# Patient Record
Sex: FEMALE | Race: BLACK OR AFRICAN AMERICAN | NOT HISPANIC OR LATINO | Employment: FULL TIME | URBAN - METROPOLITAN AREA
[De-identification: names, ages, dates, MRNs, and addresses within clinical notes are randomized per-mention and may not be internally consistent; named-entity substitution may affect disease eponyms.]

---

## 2024-10-17 DIAGNOSIS — B19.10: Primary | ICD-10-CM

## 2024-11-13 ENCOUNTER — TELEPHONE (OUTPATIENT)
Dept: INFECTIOUS DISEASES | Facility: CLINIC | Age: 24
End: 2024-11-13
Payer: MEDICAID

## 2024-11-13 DIAGNOSIS — B19.10: Primary | ICD-10-CM

## 2024-11-13 NOTE — LETTER
November 13, 2024    Sandie Marr  [address] [facility] [address]             [facility]  [address] [facility] [address]  Phone: [phone] Ms. Marr,      Our attempts to reach you by telephone have been unsuccessful.    Please reach out to me by dialing  regarding labs for your upcoming appointment.    Looking forward to hearing from you soon.      Respectfully,        [facility]

## 2024-12-12 ENCOUNTER — OFFICE VISIT (OUTPATIENT)
Dept: INFECTIOUS DISEASES | Facility: CLINIC | Age: 24
End: 2024-12-12
Payer: MEDICAID

## 2024-12-12 ENCOUNTER — LAB VISIT (OUTPATIENT)
Dept: LAB | Facility: HOSPITAL | Age: 24
End: 2024-12-12
Attending: NURSE PRACTITIONER
Payer: MEDICAID

## 2024-12-12 VITALS
TEMPERATURE: 98 F | HEART RATE: 111 BPM | WEIGHT: 109.88 LBS | HEIGHT: 59 IN | BODY MASS INDEX: 22.15 KG/M2 | SYSTOLIC BLOOD PRESSURE: 90 MMHG | RESPIRATION RATE: 12 BRPM | DIASTOLIC BLOOD PRESSURE: 60 MMHG

## 2024-12-12 DIAGNOSIS — Z86.19 HISTORY OF HEPATITIS B: ICD-10-CM

## 2024-12-12 DIAGNOSIS — B19.10: ICD-10-CM

## 2024-12-12 DIAGNOSIS — Z23 NEED FOR VACCINATION: ICD-10-CM

## 2024-12-12 DIAGNOSIS — Z86.19 HISTORY OF HEPATITIS B: Primary | ICD-10-CM

## 2024-12-12 LAB
ALBUMIN SERPL-MCNC: 4.6 G/DL (ref 3.5–5)
ALBUMIN/GLOB SERPL: 1.4 RATIO (ref 1.1–2)
ALP SERPL-CCNC: 57 UNIT/L (ref 40–150)
ALT SERPL-CCNC: 13 UNIT/L (ref 0–55)
ANION GAP SERPL CALC-SCNC: 8 MEQ/L
AST SERPL-CCNC: 20 UNIT/L (ref 5–34)
BASOPHILS # BLD AUTO: 0.07 X10(3)/MCL
BASOPHILS NFR BLD AUTO: 1 %
BILIRUB SERPL-MCNC: 0.5 MG/DL
BUN SERPL-MCNC: 9.4 MG/DL (ref 7–18.7)
C TRACH RRNA UR QL NAA+PROBE: NOT DETECTED
CALCIUM SERPL-MCNC: 10 MG/DL (ref 8.4–10.2)
CHLORIDE SERPL-SCNC: 108 MMOL/L (ref 98–107)
CO2 SERPL-SCNC: 25 MMOL/L (ref 22–29)
CREAT SERPL-MCNC: 0.79 MG/DL (ref 0.55–1.02)
CREAT/UREA NIT SERPL: 12
EOSINOPHIL # BLD AUTO: 0.08 X10(3)/MCL (ref 0–0.9)
EOSINOPHIL NFR BLD AUTO: 1.1 %
ERYTHROCYTE [DISTWIDTH] IN BLOOD BY AUTOMATED COUNT: 17.5 % (ref 11.5–17)
GFR SERPLBLD CREATININE-BSD FMLA CKD-EPI: >60 ML/MIN/1.73/M2
GLOBULIN SER-MCNC: 3.4 GM/DL (ref 2.4–3.5)
GLUCOSE SERPL-MCNC: 92 MG/DL (ref 74–100)
HAV AB SER QL IA: NONREACTIVE
HBV CORE AB SERPL QL IA: REACTIVE
HBV SURFACE AB SER-ACNC: 203.04 MIU/ML
HBV SURFACE AB SERPL IA-ACNC: REACTIVE M[IU]/ML
HBV SURFACE AG SERPL QL IA: NONREACTIVE
HCT VFR BLD AUTO: 36.3 % (ref 37–47)
HCV AB SERPL QL IA: NONREACTIVE
HGB BLD-MCNC: 11 G/DL (ref 12–16)
HIV 1+2 AB+HIV1 P24 AG SERPL QL IA: NONREACTIVE
IMM GRANULOCYTES # BLD AUTO: 0.01 X10(3)/MCL (ref 0–0.04)
IMM GRANULOCYTES NFR BLD AUTO: 0.1 %
INR PPP: 1
LYMPHOCYTES # BLD AUTO: 2.1 X10(3)/MCL (ref 0.6–4.6)
LYMPHOCYTES NFR BLD AUTO: 29.7 %
MCH RBC QN AUTO: 22.2 PG (ref 27–31)
MCHC RBC AUTO-ENTMCNC: 30.3 G/DL (ref 33–36)
MCV RBC AUTO: 73.3 FL (ref 80–94)
MONOCYTES # BLD AUTO: 0.59 X10(3)/MCL (ref 0.1–1.3)
MONOCYTES NFR BLD AUTO: 8.4 %
N GONORRHOEA DNA UR QL NAA+PROBE: NOT DETECTED
NEUTROPHILS # BLD AUTO: 4.21 X10(3)/MCL (ref 2.1–9.2)
NEUTROPHILS NFR BLD AUTO: 59.7 %
NRBC BLD AUTO-RTO: 0 %
PLATELET # BLD AUTO: 354 X10(3)/MCL (ref 130–400)
PMV BLD AUTO: 9.4 FL (ref 7.4–10.4)
POTASSIUM SERPL-SCNC: 3.9 MMOL/L (ref 3.5–5.1)
PROT SERPL-MCNC: 8 GM/DL (ref 6.4–8.3)
PROTHROMBIN TIME: 13.4 SECONDS (ref 11.4–14)
RBC # BLD AUTO: 4.95 X10(6)/MCL (ref 4.2–5.4)
SODIUM SERPL-SCNC: 141 MMOL/L (ref 136–145)
T PALLIDUM AB SER QL: NONREACTIVE
T VAGINALIS RRNA UR QL NAA+PROBE: NOT DETECTED
WBC # BLD AUTO: 7.06 X10(3)/MCL (ref 4.5–11.5)

## 2024-12-12 PROCEDURE — 87389 HIV-1 AG W/HIV-1&-2 AB AG IA: CPT

## 2024-12-12 PROCEDURE — 85610 PROTHROMBIN TIME: CPT

## 2024-12-12 PROCEDURE — 36415 COLL VENOUS BLD VENIPUNCTURE: CPT

## 2024-12-12 PROCEDURE — 99213 OFFICE O/P EST LOW 20 MIN: CPT | Mod: PBBFAC | Performed by: NURSE PRACTITIONER

## 2024-12-12 PROCEDURE — 87340 HEPATITIS B SURFACE AG IA: CPT

## 2024-12-12 PROCEDURE — 87350 HEPATITIS BE AG IA: CPT

## 2024-12-12 PROCEDURE — 86704 HEP B CORE ANTIBODY TOTAL: CPT

## 2024-12-12 PROCEDURE — 86692 HEPATITIS DELTA AGENT ANTBDY: CPT

## 2024-12-12 PROCEDURE — 87661 TRICHOMONAS VAGINALIS AMPLIF: CPT | Performed by: NURSE PRACTITIONER

## 2024-12-12 PROCEDURE — 86706 HEP B SURFACE ANTIBODY: CPT

## 2024-12-12 PROCEDURE — 80053 COMPREHEN METABOLIC PANEL: CPT

## 2024-12-12 PROCEDURE — 87517 HEPATITIS B DNA QUANT: CPT

## 2024-12-12 PROCEDURE — 90471 IMMUNIZATION ADMIN: CPT | Mod: PBBFAC

## 2024-12-12 PROCEDURE — 85025 COMPLETE CBC W/AUTO DIFF WBC: CPT

## 2024-12-12 PROCEDURE — 86803 HEPATITIS C AB TEST: CPT

## 2024-12-12 PROCEDURE — 90632 HEPA VACCINE ADULT IM: CPT | Mod: PBBFAC

## 2024-12-12 PROCEDURE — 86707 HEPATITIS BE ANTIBODY: CPT

## 2024-12-12 PROCEDURE — 86708 HEPATITIS A ANTIBODY: CPT

## 2024-12-12 PROCEDURE — 86780 TREPONEMA PALLIDUM: CPT

## 2024-12-12 RX ORDER — LANOLIN ALCOHOL/MO/W.PET/CERES
1 CREAM (GRAM) TOPICAL
COMMUNITY

## 2024-12-12 RX ORDER — ERGOCALCIFEROL 1.25 MG/1
50000 CAPSULE ORAL
COMMUNITY

## 2024-12-12 RX ADMIN — HEPATITIS A VACCINE 1440 UNITS: 1440 INJECTION, SUSPENSION INTRAMUSCULAR at 11:12

## 2024-12-12 NOTE — PROGRESS NOTES
Patient ID: Sandie Marr 24 y.o.     Chief Complaint:   Chief Complaint   Patient presents with    New Patient Hep B     Denies symptoms        HPI:    12/12/24  Sandie Capellan is a 25 yo AAF referred to [facility] by PCP EMANUEL Weeks NP for evaluation of Hep B.  She tells me that she was first informed about Hep B infection Jan 2024.  She has 2 children, ages 3 & 4, received prenatal care and never tested positive for Hep B during pregnancies. She has no known family history of Hep B.  No history for IVDU. She has not been sexually active in 7 months. She has several tattoos, one of which was placed in 2023. The tattoos were placed by professionals, the most recent one described as by a girl in her shop and she does not recall seeing the supplies opened in front of her. She denies recalling any current or past history of jaundice, icterus, nausea, vomiting, dark urine, or gisel colored stools. Labs collected Jan 2024 revealed Hep B s ag NR, Hep B c IgM +, Hep B c ab total +, Hep B s ab +.  Repeat labs with same results 9/24.  Liver enzymes normal.  HIV, Hep C, syphilis negative 1/24. Hep A ab + 1/2024, NR 9/24. Likely HBV exposure with spontaneous clearing, will repeat labs today for confirmation. Results explained to patient. All questions answered & concerns addressed.            Past Medical History:   Diagnosis Date    Anemia, unspecified     Vitamin D deficiency, unspecified         History reviewed. No pertinent surgical history.     Social History     Socioeconomic History    Marital status: Single   Tobacco Use    Smoking status: Every Day     Current packs/day: 0.50     Average packs/day: 0.5 packs/day for 8.9 years (4.5 ttl pk-yrs)     Types: Cigarettes     Start date: 2016    Smokeless tobacco: Never   Substance and Sexual Activity    Alcohol use: Yes     Alcohol/week: 2.0 standard drinks of alcohol     Types: 2 Cans of beer per week     Comment: Twisted Tea 24 ounce daily    Drug use: Not Currently     Types:  "Marijuana     Comment: Denies    Sexual activity: Not Currently     Partners: Male     Birth control/protection: Condom        Family History   Problem Relation Name Age of Onset    Hypertension Father          Review of patient's allergies indicates:  No Known Allergies     Immunization History   Administered Date(s) Administered    DTP 1998, 1998, 02/04/1999, 11/19/1999    DTaP 08/15/2002    HIB 1998, 1998, 02/04/1999, 11/19/1999    HPV Quadrivalent 11/20/2009, 08/06/2010, 08/20/2013    Hepatitis A, Adult 12/12/2024    Hepatitis B, Pediatric/Adolescent 1998, 1998, 02/04/1999    IPV 07/25/2002    Influenza (Flumist) - Quadrivalent - Intranasal *Preferred* (2-49 years old) 11/03/2014    Influenza - Intranasal 11/20/2008, 09/22/2009    MMR 08/10/1999, 07/25/2002    Meningococcal Conjugate (MCV4P) 09/22/2009, 11/03/2014    OPV 1998, 1998, 02/04/1999    Tdap 09/22/2009    Varicella 08/10/1999, 11/20/2008        Review of Systems   Constitutional: Negative.    HENT: Negative.     Eyes: Negative.    Respiratory: Negative.     Cardiovascular: Negative.    Gastrointestinal: Negative.    Genitourinary: Negative.    Musculoskeletal: Negative.    Skin: Negative.    Neurological: Negative.    Endo/Heme/Allergies: Negative.    Psychiatric/Behavioral: Negative.     All other systems reviewed and are negative.         Objective:      BP 90/60 (BP Location: Right arm, Patient Position: Sitting)   Pulse (!) 111   Temp 98.2 °F (36.8 °C) (Oral)   Resp 12   Ht 4' 11" (1.499 m)   Wt 49.9 kg (109 lb 14.4 oz)   BMI 22.20 kg/m²      Physical Exam  Vitals reviewed.   Constitutional:       General: She is not in acute distress.     Appearance: Normal appearance. She is not toxic-appearing.   Eyes:      General: No scleral icterus.  Cardiovascular:      Rate and Rhythm: Normal rate and regular rhythm.      Heart sounds: Normal heart sounds.   Pulmonary:      Effort: Pulmonary effort is " "normal. No respiratory distress.      Breath sounds: Normal breath sounds.   Abdominal:      General: Bowel sounds are normal. There is no distension.      Palpations: Abdomen is soft. There is no mass.      Tenderness: There is no abdominal tenderness.   Musculoskeletal:         General: Normal range of motion.   Skin:     General: Skin is warm and dry.   Neurological:      Mental Status: She is alert and oriented to person, place, and time.          Labs:   Lab Results   Component Value Date    WBC 7.06 12/12/2024    HGB 11.0 (L) 12/12/2024    HCT 36.3 (L) 12/12/2024    MCV 73.3 (L) 12/12/2024     12/12/2024       CMP  Sodium   Date Value Ref Range Status   12/12/2024 141 136 - 145 mmol/L Final     Potassium   Date Value Ref Range Status   12/12/2024 3.9 3.5 - 5.1 mmol/L Final     Chloride   Date Value Ref Range Status   12/12/2024 108 (H) 98 - 107 mmol/L Final     CO2   Date Value Ref Range Status   12/12/2024 25 22 - 29 mmol/L Final     Blood Urea Nitrogen   Date Value Ref Range Status   12/12/2024 9.4 7.0 - 18.7 mg/dL Final     Creatinine   Date Value Ref Range Status   12/12/2024 0.79 0.55 - 1.02 mg/dL Final     Calcium   Date Value Ref Range Status   12/12/2024 10.0 8.4 - 10.2 mg/dL Final     Albumin   Date Value Ref Range Status   12/12/2024 4.6 3.5 - 5.0 g/dL Final     Bilirubin Total   Date Value Ref Range Status   12/12/2024 0.5 <=1.5 mg/dL Final     ALP   Date Value Ref Range Status   12/12/2024 57 40 - 150 unit/L Final     AST   Date Value Ref Range Status   12/12/2024 20 5 - 34 unit/L Final     ALT   Date Value Ref Range Status   12/12/2024 13 0 - 55 unit/L Final     eGFR   Date Value Ref Range Status   12/12/2024 >60 mL/min/1.73/m2 Final     No results found for: "TSH"  HIV   Date Value Ref Range Status   12/12/2024 Nonreactive Nonreactive Final     INR   Date Value Ref Range Status   12/12/2024 1.0 <=1.3 Final     Syphilis Antibody   Date Value Ref Range Status   12/12/2024 Nonreactive " Nonreactive, Equivocal Final     No results found for this or any previous visit.  No results found for this or any previous visit.  No results found for this or any previous visit.    Imaging: Reviewed most recent relevant imaging studies available, notable results highlighted in this note      Medications:     Current Outpatient Medications   Medication Instructions    ergocalciferol (VITAMIN D2) 50,000 Units, Every 7 days    ferrous sulfate (FEOSOL) Tab tablet 1 tablet, With breakfast       Assessment:       Problem List Items Addressed This Visit    None  Visit Diagnoses       History of hepatitis B    -  Primary    Relevant Orders    HIV 1/2 Ag/Ab (4th Gen) (Completed)    Hepatitis B Core Antibody, Total    Hepatitis B e antibody    Hepatitis B e Antigen    Hepatitis B Surface Ab, Qualitative    Hepatitis B Surface Antigen    HEPATITIS B VIRAL DNA, QUANTITATIVE    Hepatitis C Antibody    SYPHILIS ANTIBODY (WITH REFLEX RPR) (Completed)    Sexually-Transmitted Infections (STIs) Increased Risk Panel    Comprehensive Metabolic Panel (Completed)    CBC Auto Differential (Completed)    Protime-INR (Completed)    Need for vaccination        Relevant Medications    Hep A (Havrix) IM vaccine (>/= 18 yo) (Completed)               Plan:      History of hepatitis B  -     HIV 1/2 Ag/Ab (4th Gen); Future; Expected date: 12/12/2024  -     Hepatitis B Core Antibody, Total; Future; Expected date: 12/12/2024  -     Hepatitis B e antibody; Future; Expected date: 12/12/2024  -     Hepatitis B e Antigen; Future; Expected date: 12/12/2024  -     Hepatitis B Surface Ab, Qualitative; Future; Expected date: 12/12/2024  -     Hepatitis B Surface Antigen; Future; Expected date: 12/12/2024  -     HEPATITIS B VIRAL DNA, QUANTITATIVE; Future; Expected date: 12/12/2024  -     Hepatitis C Antibody; Future; Expected date: 12/12/2024  -     SYPHILIS ANTIBODY (WITH REFLEX RPR); Future; Expected date: 12/12/2024  -     Sexually-Transmitted  Infections (STIs) Increased Risk Panel; Future; Expected date: 12/12/2024  -     Comprehensive Metabolic Panel; Future; Expected date: 12/12/2024  -     CBC Auto Differential; Future; Expected date: 12/12/2024  -     Protime-INR; Future; Expected date: 12/12/2024  Extensive education and lab review with patient.   All questions answered.   Repeat labs today for confirmation.   Blood precautions, sexual health education provided.   RTC 2 weeks with Naye for results, virtual.   Will cancel FibroScan once results are confirmed.     Need for vaccination  -     Hep A (Havrix) IM vaccine (>/= 18 yo)  Hep A #1 today, #2 in 6 months.

## 2024-12-13 LAB
HBV DNA SERPL NAA+PROBE-ACNC: NOT DETECTED [IU]/ML
HBV E AG SERPL QL IA: NEGATIVE
HBV E IGG SER QL IA: POSITIVE

## 2024-12-16 LAB — HDV AB SER QL IA: NEGATIVE

## 2024-12-17 ENCOUNTER — TELEPHONE (OUTPATIENT)
Dept: INFECTIOUS DISEASES | Facility: CLINIC | Age: 24
End: 2024-12-17
Payer: MEDICAID

## 2024-12-17 NOTE — TELEPHONE ENCOUNTER
----- Message from Rosetta sent at 12/17/2024  9:15 AM CST -----  Patient of Naye Meng with [facility] requesting notes for Date of Service 12/12/24.    [phone]  9:17  Thanks

## 2024-12-18 NOTE — TELEPHONE ENCOUNTER
Clinic office note printed and faxed to [facility] at [phone]. Confirmed with fax verification sheet.

## 2025-01-28 ENCOUNTER — OFFICE VISIT (OUTPATIENT)
Dept: INFECTIOUS DISEASES | Facility: CLINIC | Age: 25
End: 2025-01-28
Payer: MEDICAID

## 2025-01-28 DIAGNOSIS — Z86.19 HISTORY OF HEPATITIS B: Primary | ICD-10-CM

## 2025-01-28 DIAGNOSIS — Z23 NEED FOR VACCINATION: ICD-10-CM

## 2025-01-28 NOTE — PROGRESS NOTES
Patient ID: Sandie Marr 24 y.o.   Patient and provider are located in the state [facility].    Face to Face time with patient:  20 minutes of total time spent on the encounter, which includes face to face time and non-face to face time preparing to see the patient (eg, review of tests), Obtaining and/or reviewing separately obtained history, Documenting clinical information in the electronic or other health record, Independently interpreting results (not separately reported) and communicating results to the patient/family/caregiver, or Care coordination (not separately reported).     Each patient to whom he or she provides medical services by telemedicine is:  (1) informed of the relationship between the physician and patient and the respective role of any other health care provider with respect to management of the patient; and (2) notified that he or she may decline to receive medical services by telemedicine and may withdraw from such care at any time.  Chief Complaint:   Chief Complaint   Patient presents with    Follow-up Hep B     Denies problems        HPI:    1/28/25  Sandie Capellan is a 23 yo AAF evaluated today via audio-video virtual visit for f/u.  Lab results reviewed. 12/12/24 Hep B s ag NR, Hep B DNA ND, Hep B e ag NR, Hep B e ab positive, HDV negative, Hep B core ab +, Hep B s ab +.  HIV NR, RPR NR, ct/gc/trich negative, Hep c ab negative.  She is not sexually active, not in a relationship.  Sexual health counseling provided.  PrEP education provided, will notify me if need should arise. She tolerated 1st dose of Hep A vaccine well, will return in 6 months for 2nd dose. All questions answered & concerns addressed.    12/12/24  Sandie Capellan is a 23 yo AAF referred to [facility] by PCP EMANUEL Weeks NP for evaluation of Hep B.  She tells me that she was first informed about Hep B infection Jan 2024.  She has 2 children, ages 3 & 4, received prenatal care and never tested positive for Hep B during pregnancies. She has  no known family history of Hep B.  No history for IVDU. She has not been sexually active in 7 months. She has several tattoos, one of which was placed in 2023. The tattoos were placed by professionals, the most recent one described as by a girl in her shop and she does not recall seeing the supplies opened in front of her. She denies recalling any current or past history of jaundice, icterus, nausea, vomiting, dark urine, or gisel colored stools. Labs collected Jan 2024 revealed Hep B s ag NR, Hep B c IgM +, Hep B c ab total +, Hep B s ab +.  Repeat labs with same results 9/24.  Liver enzymes normal.  HIV, Hep C, syphilis negative 1/24. Hep A ab + 1/2024, NR 9/24. Likely HBV exposure with spontaneous clearing, will repeat labs today for confirmation. Results explained to patient. All questions answered & concerns addressed.              Past Medical History:   Diagnosis Date    Anemia, unspecified     Unspecified viral hepatitis B without hepatic coma     Vitamin D deficiency, unspecified         History reviewed. No pertinent surgical history.     Social History     Socioeconomic History    Marital status: Single   Tobacco Use    Smoking status: Every Day     Current packs/day: 0.50     Average packs/day: 0.5 packs/day for 9.1 years (4.5 ttl pk-yrs)     Types: Cigarettes     Start date: 2016    Smokeless tobacco: Never   Substance and Sexual Activity    Alcohol use: Yes     Alcohol/week: 2.0 standard drinks of alcohol     Types: 2 Cans of beer per week     Comment: Twisted Tea 24 ounce daily    Drug use: Not Currently     Types: Marijuana     Comment: Denies    Sexual activity: Not Currently     Partners: Male     Birth control/protection: Condom        Family History   Problem Relation Name Age of Onset    Hypertension Father          Review of patient's allergies indicates:  No Known Allergies     Immunization History   Administered Date(s) Administered    DTP 1998, 1998, 02/04/1999, 11/19/1999    DTaP  08/15/2002    HIB 1998, 1998, 02/04/1999, 11/19/1999    HPV Quadrivalent 11/20/2009, 08/06/2010, 08/20/2013    Hepatitis A, Adult 12/12/2024    Hepatitis B, Pediatric/Adolescent 1998, 1998, 02/04/1999    IPV 07/25/2002    Influenza (Flumist) - Quadrivalent - Intranasal *Preferred* (2-49 years old) 11/03/2014    Influenza - Intranasal 11/20/2008, 09/22/2009    MMR 08/10/1999, 07/25/2002    Meningococcal Conjugate (MCV4P) 09/22/2009, 11/03/2014    OPV 1998, 1998, 02/04/1999    Tdap 09/22/2009    Varicella 08/10/1999, 11/20/2008        Review of Systems   Constitutional: Negative.    HENT: Negative.     Eyes: Negative.    Respiratory: Negative.     Cardiovascular: Negative.    Gastrointestinal: Negative.    Genitourinary: Negative.    Musculoskeletal: Negative.    Skin: Negative.    Neurological: Negative.    Endo/Heme/Allergies: Negative.    Psychiatric/Behavioral: Negative.     All other systems reviewed and are negative.         Objective:      There were no vitals taken for this visit.     Physical Exam  Constitutional:       General: She is not in acute distress.     Appearance: Normal appearance.   HENT:      Head: Normocephalic.   Eyes:      Conjunctiva/sclera: Conjunctivae normal.   Pulmonary:      Effort: Pulmonary effort is normal.   Musculoskeletal:         General: Normal range of motion.      Cervical back: Normal range of motion.   Neurological:      General: No focal deficit present.      Mental Status: She is alert and oriented to person, place, and time. Mental status is at baseline.   Psychiatric:         Mood and Affect: Mood normal.         Behavior: Behavior normal.         Thought Content: Thought content normal.         Judgment: Judgment normal.          Labs:   Lab Results   Component Value Date    WBC 7.06 12/12/2024    HGB 11.0 (L) 12/12/2024    HCT 36.3 (L) 12/12/2024    MCV 73.3 (L) 12/12/2024     12/12/2024       CMP  Sodium   Date Value Ref Range  "Status   12/12/2024 141 136 - 145 mmol/L Final     Potassium   Date Value Ref Range Status   12/12/2024 3.9 3.5 - 5.1 mmol/L Final     Chloride   Date Value Ref Range Status   12/12/2024 108 (H) 98 - 107 mmol/L Final     CO2   Date Value Ref Range Status   12/12/2024 25 22 - 29 mmol/L Final     Blood Urea Nitrogen   Date Value Ref Range Status   12/12/2024 9.4 7.0 - 18.7 mg/dL Final     Creatinine   Date Value Ref Range Status   12/12/2024 0.79 0.55 - 1.02 mg/dL Final     Calcium   Date Value Ref Range Status   12/12/2024 10.0 8.4 - 10.2 mg/dL Final     Albumin   Date Value Ref Range Status   12/12/2024 4.6 3.5 - 5.0 g/dL Final     Bilirubin Total   Date Value Ref Range Status   12/12/2024 0.5 <=1.5 mg/dL Final     ALP   Date Value Ref Range Status   12/12/2024 57 40 - 150 unit/L Final     AST   Date Value Ref Range Status   12/12/2024 20 5 - 34 unit/L Final     ALT   Date Value Ref Range Status   12/12/2024 13 0 - 55 unit/L Final     eGFR   Date Value Ref Range Status   12/12/2024 >60 mL/min/1.73/m2 Final     No results found for: "TSH"  HIV   Date Value Ref Range Status   12/12/2024 Nonreactive Nonreactive Final     INR   Date Value Ref Range Status   12/12/2024 1.0 <=1.3 Final     Syphilis Antibody   Date Value Ref Range Status   12/12/2024 Nonreactive Nonreactive, Equivocal Final     Hep BsAb Interp   Date Value Ref Range Status   12/12/2024 Reactive (A) Nonreactive Final     Hep BsAb   Date Value Ref Range Status   12/12/2024 203.04 mIU/mL Final     Comment:     Interpretive Data Hep Bs Ab#  Result (mIU/mL)Interpretation - Hep B Surface Antibody  <8.00Nonreactive: Patient considered not immune to HBV infection  >=8.00 to <12.00Grayzone: Unable to determine immune status. Follow-up testing should be performed  >=12.00Reactive: Patient considered immune to HBV infection       Hep C Ab Interp   Date Value Ref Range Status   12/12/2024 Nonreactive Nonreactive Final     Results for orders placed or performed in " visit on 12/12/24   Hepatitis B e Antigen   Result Value Ref Range    Hepatitis Be Ag, S Negative Negative     Results for orders placed or performed in visit on 12/12/24   Hepatitis B e antibody   Result Value Ref Range    HBe Antibody Positive (A) Negative     No results found for this or any previous visit.    Imaging: Reviewed most recent relevant imaging studies available, notable results highlighted in this note      Medications:     Current Outpatient Medications   Medication Instructions    ergocalciferol (VITAMIN D2) 50,000 Units, Every 7 days    ferrous sulfate (FEOSOL) Tab tablet 1 tablet, With breakfast       Assessment:       Problem List Items Addressed This Visit    None  Visit Diagnoses       History of hepatitis B    -  Primary    Need for vaccination                   Plan:      History of hepatitis B with spontaneous clearance and retained natural immunity  12/24 Hep B s ag NR  12/24 Hep B DNA not detected  12/24 Hep B e ag Negative  12/24 Hep B e ab +  12/24 Hep B c ab +  12/24 Hep B s ab +, titer 203  12/24 HDV ab Negative  12/24 HIV NR, Hep C ab negative, RPR NR, urine negative for ct/gc/trich.  Sexual health counseling provided.   Reach out to me if need for PrEP arises.  RTC PRN.    Need for vaccination  Please schedule patient for nurse visit 7/2025 to receive Hep A vaccine dose #2.